# Patient Record
Sex: MALE | Race: WHITE | NOT HISPANIC OR LATINO | Employment: OTHER | URBAN - METROPOLITAN AREA
[De-identification: names, ages, dates, MRNs, and addresses within clinical notes are randomized per-mention and may not be internally consistent; named-entity substitution may affect disease eponyms.]

---

## 2023-07-22 ENCOUNTER — APPOINTMENT (EMERGENCY)
Dept: RADIOLOGY | Facility: HOSPITAL | Age: 68
End: 2023-07-22
Payer: COMMERCIAL

## 2023-07-22 ENCOUNTER — APPOINTMENT (EMERGENCY)
Dept: CT IMAGING | Facility: HOSPITAL | Age: 68
End: 2023-07-22
Payer: COMMERCIAL

## 2023-07-22 ENCOUNTER — HOSPITAL ENCOUNTER (EMERGENCY)
Facility: HOSPITAL | Age: 68
Discharge: HOME | End: 2023-07-22
Attending: EMERGENCY MEDICINE
Payer: COMMERCIAL

## 2023-07-22 VITALS
HEART RATE: 52 BPM | WEIGHT: 195.55 LBS | DIASTOLIC BLOOD PRESSURE: 75 MMHG | OXYGEN SATURATION: 94 % | RESPIRATION RATE: 18 BRPM | TEMPERATURE: 98.2 F | SYSTOLIC BLOOD PRESSURE: 117 MMHG
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
## 2023-07-22 DIAGNOSIS — M89.9 BONE LESION: ICD-10-CM

## 2023-07-22 DIAGNOSIS — K76.0 FATTY LIVER: ICD-10-CM

## 2023-07-22 DIAGNOSIS — T14.8XXA ABRASION: ICD-10-CM

## 2023-07-22 LAB
ANION GAP SERPL CALCULATED.3IONS-SCNC: 9 MMOL/L
APTT PPP: 47 SECONDS (ref 23–37)
ATRIAL RATE: 58 BPM
BASOPHILS # BLD AUTO: 0.07 THOUSANDS/ÂΜL (ref 0–0.1)
BASOPHILS NFR BLD AUTO: 1 % (ref 0–1)
BUN SERPL-MCNC: 21 MG/DL (ref 5–25)
CALCIUM SERPL-MCNC: 9.6 MG/DL (ref 8.4–10.2)
CARDIAC TROPONIN I PNL SERPL HS: 3 NG/L
CHLORIDE SERPL-SCNC: 105 MMOL/L (ref 96–108)
CO2 SERPL-SCNC: 24 MMOL/L (ref 21–32)
CREAT SERPL-MCNC: 1.25 MG/DL (ref 0.6–1.3)
EOSINOPHIL # BLD AUTO: 0.36 THOUSAND/ÂΜL (ref 0–0.61)
EOSINOPHIL NFR BLD AUTO: 5 % (ref 0–6)
ERYTHROCYTE [DISTWIDTH] IN BLOOD BY AUTOMATED COUNT: 13 % (ref 11.6–15.1)
GFR SERPL CREATININE-BSD FRML MDRD: 59 ML/MIN/1.73SQ M
GLUCOSE SERPL-MCNC: 110 MG/DL (ref 65–140)
HCT VFR BLD AUTO: 38.4 % (ref 36.5–49.3)
HGB BLD-MCNC: 12.7 G/DL (ref 12–17)
IMM GRANULOCYTES # BLD AUTO: 0.02 THOUSAND/UL (ref 0–0.2)
IMM GRANULOCYTES NFR BLD AUTO: 0 % (ref 0–2)
INR PPP: 2.4 (ref 0.84–1.19)
LYMPHOCYTES # BLD AUTO: 2.98 THOUSANDS/ÂΜL (ref 0.6–4.47)
LYMPHOCYTES NFR BLD AUTO: 37 % (ref 14–44)
MCH RBC QN AUTO: 30.5 PG (ref 26.8–34.3)
MCHC RBC AUTO-ENTMCNC: 33.1 G/DL (ref 31.4–37.4)
MCV RBC AUTO: 92 FL (ref 82–98)
MONOCYTES # BLD AUTO: 0.54 THOUSAND/ÂΜL (ref 0.17–1.22)
MONOCYTES NFR BLD AUTO: 7 % (ref 4–12)
NEUTROPHILS # BLD AUTO: 4.01 THOUSANDS/ÂΜL (ref 1.85–7.62)
NEUTS SEG NFR BLD AUTO: 50 % (ref 43–75)
NRBC BLD AUTO-RTO: 0 /100 WBCS
PLATELET # BLD AUTO: 211 THOUSANDS/UL (ref 149–390)
PMV BLD AUTO: 8.9 FL (ref 8.9–12.7)
POTASSIUM SERPL-SCNC: 3.4 MMOL/L (ref 3.5–5.3)
PROTHROMBIN TIME: 27.4 SECONDS (ref 11.6–14.5)
QRS AXIS: -53 DEGREES
QRSD INTERVAL: 152 MS
QT INTERVAL: 474 MS
QTC INTERVAL: 465 MS
RBC # BLD AUTO: 4.16 MILLION/UL (ref 3.88–5.62)
SODIUM SERPL-SCNC: 138 MMOL/L (ref 135–147)
T WAVE AXIS: 73 DEGREES
VENTRICULAR RATE: 58 BPM
WBC # BLD AUTO: 7.98 THOUSAND/UL (ref 4.31–10.16)

## 2023-07-22 PROCEDURE — 70450 CT HEAD/BRAIN W/O DYE: CPT

## 2023-07-22 PROCEDURE — 84484 ASSAY OF TROPONIN QUANT: CPT | Performed by: EMERGENCY MEDICINE

## 2023-07-22 PROCEDURE — 72125 CT NECK SPINE W/O DYE: CPT

## 2023-07-22 PROCEDURE — 93005 ELECTROCARDIOGRAM TRACING: CPT

## 2023-07-22 PROCEDURE — 80048 BASIC METABOLIC PNL TOTAL CA: CPT | Performed by: EMERGENCY MEDICINE

## 2023-07-22 PROCEDURE — 74177 CT ABD & PELVIS W/CONTRAST: CPT

## 2023-07-22 PROCEDURE — 85610 PROTHROMBIN TIME: CPT | Performed by: EMERGENCY MEDICINE

## 2023-07-22 PROCEDURE — 71260 CT THORAX DX C+: CPT

## 2023-07-22 PROCEDURE — 71045 X-RAY EXAM CHEST 1 VIEW: CPT

## 2023-07-22 PROCEDURE — 85025 COMPLETE CBC W/AUTO DIFF WBC: CPT | Performed by: EMERGENCY MEDICINE

## 2023-07-22 PROCEDURE — 36415 COLL VENOUS BLD VENIPUNCTURE: CPT | Performed by: EMERGENCY MEDICINE

## 2023-07-22 PROCEDURE — 85730 THROMBOPLASTIN TIME PARTIAL: CPT | Performed by: EMERGENCY MEDICINE

## 2023-07-22 PROCEDURE — 99285 EMERGENCY DEPT VISIT HI MDM: CPT

## 2023-07-22 RX ADMIN — IOHEXOL 100 ML: 350 INJECTION, SOLUTION INTRAVENOUS at 19:19

## 2023-07-22 NOTE — ED PROVIDER NOTES
Emergency Department Trauma Note  Tashi Newberry 79 y.o. male MRN: [id number]  Unit/Bed#: ED 06/ED 06 Encounter: [id number]      Trauma Alert: Trauma Acuity: Trauma Evaluation  Model of Arrival: Mode of Arrival: BLS via Trauma Squad Name and Number: [id number]  Trauma Team: Current Providers  Attending Provider: Yobany Hurley DO  Registered Nurse: Edmar Love RN  Consultants:     None      History of Present Illness     Chief Complaint:   Chief Complaint   Patient presents with   • Fall     Pt to ED following a fall. Pt states he was at bday party outside, felt a little dizzy and then passed out. Family unable to stop fall, landed on ground. +xarelto. No pain. HPI:  Tashi Newberry is a 79 y.o. male who presents with syncopal episode and associated fall. Mechanism:Details of Incident: pt was dizzy, stood up to walk inside and passed out before going in. fell outside onto concrete Injury Date: 07/22/23 Injury Time: 1 Injury Occurence Location - [address] [place] [address]: [place]    79year old male presents for evaluation of lightheadedness and syncopal episode that occurred shortly prior to arrival. Patient with a prior history of similar episodes. Patient was feeling lightheaded while walking, sat down, and when he stood back up lost consciousness for a few seconds. Rapid return to baseline. Denies preceding chest pain, SOB, abdominal pain. Thinks he may have hit his head         Review of Systems   Constitutional: Negative for fever. Neurological: Positive for light-headedness. Historical Information     Immunizations: There is no immunization history on file for this patient. Past Medical History:   Diagnosis Date   • A-fib [facility])      History reviewed. No pertinent family history.   Past Surgical History:   Procedure Laterality Date   • BACK SURGERY     • HERNIA REPAIR       Social History     Tobacco Use   • Smoking status: Never     Passive exposure: Never   • Smokeless tobacco: Never   Substance Use Topics • Alcohol use: Not Currently     E-Cigarette/Vaping     E-Cigarette/Vaping Substances       Family History: non-contributory    Meds/Allergies   None       No Known Allergies    PHYSICAL EXAM    PE limited by: none    Objective   Vitals:   First set: Temperature: 98.2 °F (36.8 °C) (07/22/23 1722)  Pulse: 60 (07/22/23 1722)  Respirations: 18 (07/22/23 1722)  Blood Pressure: 103/62 (07/22/23 1722)  SpO2: 99 % (07/22/23 1722)    Primary Survey:   (A) Airway: intact  (B) Breathing: normal  (C) Circulation: Pulses:   normal  (D) Disabliity:  GCS Total:  15  (E) Expose:  Completed    Secondary Survey: (Click on Physical Exam tab above)  Physical Exam  Vitals and nursing note reviewed. Constitutional:       General: He is not in acute distress. Appearance: He is well-developed. HENT:      Head: Normocephalic and atraumatic. Right Ear: External ear normal.      Left Ear: External ear normal.      Nose: Nose normal.   Eyes:      General: No scleral icterus. Extraocular Movements: Extraocular movements intact. Pupils: Pupils are equal, round, and reactive to light. Neck:      Comments: No CTL spine tenderness  Pulmonary:      Effort: Pulmonary effort is normal. No respiratory distress. Abdominal:      General: There is no distension. Palpations: Abdomen is soft. Musculoskeletal:         General: No deformity. Normal range of motion. Cervical back: Normal range of motion and neck supple. Comments: Abrasion to right elbow. Non tender. Skin:     General: Skin is warm. Findings: No rash. Neurological:      General: No focal deficit present. Mental Status: He is alert. Gait: Gait normal.   Psychiatric:         Mood and Affect: Mood normal.         Cervical spine cleared by clinical criteria?  No (imaging required)      Invasive Devices     None                 Lab Results:   Results Reviewed     Procedure Component Value Units Date/Time    Basic metabolic panel [[id number]]  (Abnormal) Collected: 07/22/23 1726    Lab Status: Final result Specimen: Blood from Arm, Right Updated: 07/22/23 1803     Sodium 138 mmol/L      Potassium 3.4 mmol/L      Chloride 105 mmol/L      CO2 24 mmol/L      ANION GAP 9 mmol/L      BUN 21 mg/dL      Creatinine 1.25 mg/dL      Glucose 110 mg/dL      Calcium 9.6 mg/dL      eGFR 59 ml/min/1.73sq m     Narrative:      [place] guidelines for Chronic Kidney Disease (CKD):   •  Stage 1 with normal or high GFR (GFR > 90 mL/min/1.73 square meters)  •  Stage 2 Mild CKD (GFR = 60-89 mL/min/1.73 square meters)  •  Stage 3A Moderate CKD (GFR = 45-59 mL/min/1.73 square meters)  •  Stage 3B Moderate CKD (GFR = 30-44 mL/min/1.73 square meters)  •  Stage 4 Severe CKD (GFR = 15-29 mL/min/1.73 square meters)  •  Stage 5 End Stage CKD (GFR <15 mL/min/1.73 square meters)  Note: GFR calculation is accurate only with a steady state creatinine    HS Troponin 0hr (reflex protocol) [[id number]]  (Normal) Collected: 07/22/23 1726    Lab Status: Final result Specimen: Blood from Arm, Right Updated: 07/22/23 1754     hs TnI 0hr 3 ng/L     APTT [[id number]]  (Abnormal) Collected: 07/22/23 1726    Lab Status: Final result Specimen: Blood from Arm, Right Updated: 07/22/23 1745     PTT 47 seconds     Protime-INR [[id number]]  (Abnormal) Collected: 07/22/23 1726    Lab Status: Final result Specimen: Blood from Arm, Right Updated: 07/22/23 1745     Protime 27.4 seconds      INR 2.40    CBC and differential [[id number]] Collected: 07/22/23 1726    Lab Status: Final result Specimen: Blood from Arm, Right Updated: 07/22/23 1732     WBC 7.98 Thousand/uL      RBC 4.16 Million/uL      Hemoglobin 12.7 g/dL      Hematocrit 38.4 %      MCV 92 fL      MCH 30.5 pg      MCHC 33.1 g/dL      RDW 13.0 %      MPV 8.9 fL      Platelets 145 Thousands/uL      nRBC 0 /100 WBCs      Neutrophils Relative 50 %      Immat GRANS % 0 %      Lymphocytes Relative 37 %      Monocytes Relative 7 %      Eosinophils Relative 5 %      Basophils Relative 1 %      Neutrophils Absolute 4.01 Thousands/µL      Immature Grans Absolute 0.02 Thousand/uL      Lymphocytes Absolute 2.98 Thousands/µL      Monocytes Absolute 0.54 Thousand/µL      Eosinophils Absolute 0.36 Thousand/µL      Basophils Absolute 0.07 Thousands/µL                  Imaging Studies:   Direct to CT: Yes  CT chest abdomen pelvis w contrast   Final Result by Megan Buckner MD (07/22 2102)      2 cm well-circumscribed lesion with sclerotic borders (indicating it is slow-growing) is seen in the distal right clavicle. Internal contents measure lipomatous density. There is no associated cortical reaction, soft tissue mass or cortical breakthrough. I suspect this is either an intraosseous lipoma or cystic degenerative change associated with the right AC joint. Loose bodies are seen associated with the right humeral head/glenohumeral joint. Recommend MR of the right shoulder to be performed    electively for further evaluation. 7 mm lucent lesion with well-defined (but not sclerotic) borders is seen in the left mid clavicle. No associated cortical reaction, groundglass matrix, soft tissue mass, extension of the lesion beyond the cortical margins, or other aggressive features    visualized. Recommend serum protein electrophoresis (to exclude the possibility of myeloma which is unlikely). If patient has pain in this region (left shoulder), an MRI of the left clavicle can be obtained for further evaluation. No acute traumatic injury visualized. Other nonacute findings as above. The study was marked in [facility] for immediate notification. Workstation performed: [id number]         TRAUMA - CT head wo contrast   Final Result by Joseph Walker MD (07/22 1752)      No acute intracranial abnormality. Mild mucosal thickening of the bilateral maxillary sinuses.       The study was marked in [facility] for immediate notification. Workstation performed: [id number]         TRAUMA - CT spine cervical wo contrast   Final Result by Jaclyn Leon MD (07/22 1757)      No cervical spine fracture or traumatic malalignment. The 7 mm lucency in the mid body of the left clavicle seen on recent chest radiograph was not included within the field-of-view on CT cervical spine. Recommend dedicated left shoulder CT including the left clavicle for further evaluation. The study was marked in [facility] for immediate notification. Workstation performed: [id number]         XR Trauma chest portable   Final Result by aMurice Crane MD (07/22 1744)      No acute cardiopulmonary disease. No acute displaced fracture. 7 mm lucency in the mid body of the left clavicle, question mets or myeloma. A CT scan of the cervical spine and head CT is pending which can be evaluated for further lytic lesions. I notified Dr. Bernadette George by secure text on 7/22/2023 5:44 PM. He responded immediately. Workstation performed: [id number]               Procedures  Procedures         ED Course           Medical Decision Making  79year old male with syncopal episode and fall. Trauma evaluation called. CXR with lytic lesion. CTCAP added for further evaluation. Discussed all results with patient and family and importance of close FU. Abrasion: acute illness or injury  Fall, initial encounter: acute illness or injury  Amount and/or Complexity of Data Reviewed  Labs: ordered. Radiology: ordered. Risk  Prescription drug management. Disposition  [facility] Transfer: No  Final diagnoses:   Fall, initial encounter   Bone lesion   Abrasion   Fatty liver     Time reflects when diagnosis was documented in both MDM as applicable and the Disposition within this note     Time User Action Codes Description Comment    7/22/2023  9:12 PM Magali Mcrae [V54. XXXA] Fall, initial encounter     7/22/2023  9:12 PM Beatris Linares Add [M89.9] Bone lesion     7/22/2023  9:12 PM Abron Meline. 8XXA] Abrasion     7/22/2023  9:14 PM Beatris Linares Add [K76.0] Fatty liver       ED Disposition     ED Disposition   Discharge    Condition   Stable    Date/Time   Sat Jul 22, 2023  9:12 PM    Comment   Annabel Cardenas discharge to home/self care. Follow-up Information     Follow up With Specialties Details Why Contact Info Additional Information    Your primary care provider  In 3 days        [address] [facility] Emergency Department Emergency Medicine  If symptoms worsen [address] [address]  [address] [facility] Emergency Department, [address] [facility], [place], [address] [facility]        There are no discharge medications for this patient. No discharge procedures on file.     PDMP Review     None          ED Provider  Electronically Signed by         Beatris Linares DO  07/22/23 3976

## 2023-07-24 LAB
ATRIAL RATE: 58 BPM
PR INTERVAL: 284 MS
QRS AXIS: -53 DEGREES
QRSD INTERVAL: 152 MS
QT INTERVAL: 474 MS
QTC INTERVAL: 465 MS
T WAVE AXIS: 73 DEGREES
VENTRICULAR RATE: 58 BPM

## 2023-07-24 PROCEDURE — 93010 ELECTROCARDIOGRAM REPORT: CPT | Performed by: INTERNAL MEDICINE
